# Patient Record
Sex: MALE | Race: WHITE | NOT HISPANIC OR LATINO | URBAN - METROPOLITAN AREA
[De-identification: names, ages, dates, MRNs, and addresses within clinical notes are randomized per-mention and may not be internally consistent; named-entity substitution may affect disease eponyms.]

---

## 2022-07-14 ENCOUNTER — LAB OUTSIDE AN ENCOUNTER (OUTPATIENT)
Dept: URBAN - METROPOLITAN AREA CLINIC 63 | Facility: CLINIC | Age: 22
End: 2022-07-14

## 2022-07-14 ENCOUNTER — OFFICE VISIT (OUTPATIENT)
Dept: URBAN - METROPOLITAN AREA CLINIC 63 | Facility: CLINIC | Age: 22
End: 2022-07-14
Payer: COMMERCIAL

## 2022-07-14 ENCOUNTER — WEB ENCOUNTER (OUTPATIENT)
Dept: URBAN - METROPOLITAN AREA CLINIC 63 | Facility: CLINIC | Age: 22
End: 2022-07-14

## 2022-07-14 VITALS
DIASTOLIC BLOOD PRESSURE: 90 MMHG | BODY MASS INDEX: 23.51 KG/M2 | OXYGEN SATURATION: 96 % | TEMPERATURE: 98 F | WEIGHT: 164.2 LBS | HEART RATE: 116 BPM | SYSTOLIC BLOOD PRESSURE: 142 MMHG | HEIGHT: 70 IN

## 2022-07-14 DIAGNOSIS — R10.84 GENERALIZED ABDOMINAL PAIN: ICD-10-CM

## 2022-07-14 DIAGNOSIS — R19.4 CHANGE IN BOWEL HABIT: ICD-10-CM

## 2022-07-14 PROCEDURE — 99203 OFFICE O/P NEW LOW 30 MIN: CPT | Performed by: INTERNAL MEDICINE

## 2022-07-14 NOTE — HPI-TODAY'S VISIT:
Manish is a 22 y/o male that presents today as a new patient. Past medical history significant for anxiety and depression. Denies any prior GI history or surgical history. He presents today for further evaluation of his GI symptoms. He complains of generalized abdominal discomfort and bloating. He will sometimes have several bowel movements in a day. However, he denies frequent diarrhea or loose stools. He will have an occasional loose or soft stool. Denies blood in the stool, melena, hematochezia. Denies frequent heartbur or dysphagia.

## 2022-07-20 ENCOUNTER — TELEPHONE ENCOUNTER (OUTPATIENT)
Dept: URBAN - METROPOLITAN AREA CLINIC 63 | Facility: CLINIC | Age: 22
End: 2022-07-20

## 2022-08-22 ENCOUNTER — OFFICE VISIT (OUTPATIENT)
Dept: URBAN - METROPOLITAN AREA SURGERY CENTER 4 | Facility: SURGERY CENTER | Age: 22
End: 2022-08-22
Payer: COMMERCIAL

## 2022-08-22 ENCOUNTER — CLAIMS CREATED FROM THE CLAIM WINDOW (OUTPATIENT)
Dept: URBAN - METROPOLITAN AREA CLINIC 4 | Facility: CLINIC | Age: 22
End: 2022-08-22
Payer: COMMERCIAL

## 2022-08-22 DIAGNOSIS — K31.89 OTHER DISEASES OF STOMACH AND DUODENUM: ICD-10-CM

## 2022-08-22 DIAGNOSIS — R14.0 ABDOMINAL BLOATING: ICD-10-CM

## 2022-08-22 DIAGNOSIS — K63.89 OTHER SPECIFIED DISEASES OF INTESTINE: ICD-10-CM

## 2022-08-22 DIAGNOSIS — K21.9 GASTRO-ESOPHAGEAL REFLUX DISEASE WITHOUT ESOPHAGITIS: ICD-10-CM

## 2022-08-22 DIAGNOSIS — R10.84 GENERALIZED ABDOMINAL PAIN: ICD-10-CM

## 2022-08-22 DIAGNOSIS — K64.0 GRADE I HEMORRHOIDS: ICD-10-CM

## 2022-08-22 DIAGNOSIS — K22.89 OTHER SPECIFIED DISEASE OF ESOPHAGUS: ICD-10-CM

## 2022-08-22 DIAGNOSIS — K29.70 GASTRITIS, UNSPECIFIED, WITHOUT BLEEDING: ICD-10-CM

## 2022-08-22 PROCEDURE — 43239 EGD BIOPSY SINGLE/MULTIPLE: CPT | Performed by: INTERNAL MEDICINE

## 2022-08-22 PROCEDURE — 45380 COLONOSCOPY AND BIOPSY: CPT | Performed by: INTERNAL MEDICINE

## 2022-08-22 PROCEDURE — 88313 SPECIAL STAINS GROUP 2: CPT | Performed by: PATHOLOGY

## 2022-08-22 PROCEDURE — 88312 SPECIAL STAINS GROUP 1: CPT | Performed by: PATHOLOGY

## 2022-08-22 PROCEDURE — 88342 IMHCHEM/IMCYTCHM 1ST ANTB: CPT | Performed by: PATHOLOGY

## 2022-08-22 PROCEDURE — 88305 TISSUE EXAM BY PATHOLOGIST: CPT | Performed by: PATHOLOGY

## 2022-08-22 PROCEDURE — G8907 PT DOC NO EVENTS ON DISCHARG: HCPCS | Performed by: INTERNAL MEDICINE

## 2022-08-22 PROCEDURE — G8918 PT W/O PREOP ORDER IV AB PRO: HCPCS | Performed by: INTERNAL MEDICINE

## 2022-08-23 PROBLEM — 196731005 GASTRODUODENITIS: Status: ACTIVE | Noted: 2022-08-23

## 2022-09-06 ENCOUNTER — OFFICE VISIT (OUTPATIENT)
Dept: URBAN - METROPOLITAN AREA CLINIC 63 | Facility: CLINIC | Age: 22
End: 2022-09-06

## 2022-09-06 NOTE — HPI-TODAY'S VISIT:
21-year-old male presented to the office in July for evaluation of generalized abdominal discomfort, bloating, and frequent bowel movements.  He reported having several bowel movements in a day but denied any diarrhea or loose stools.  He denied any blood in the stool.  Labs were ordered including CBC, CMP, CRP, lipase, celiac panel but have not been completed.  CT abdomen/pelvis was ordered but was not completed. He follows up after EGD and colonoscopy which were done on August 22, 2022.  His EGD demonstrated an irregular Z-line at the GE junction.  Mild gastritis was found in the gastric body and antrum.  The duodenum appeared normal.  Colonoscopy was normal to the terminal ileum with the exception of small internal hemorrhoids.  Repeat colonoscopy was recommended at age 43.

## 2022-09-19 ENCOUNTER — OFFICE VISIT (OUTPATIENT)
Dept: URBAN - METROPOLITAN AREA CLINIC 63 | Facility: CLINIC | Age: 22
End: 2022-09-19

## 2022-09-23 ENCOUNTER — DASHBOARD ENCOUNTERS (OUTPATIENT)
Age: 22
End: 2022-09-23

## 2022-09-26 ENCOUNTER — OFFICE VISIT (OUTPATIENT)
Dept: URBAN - METROPOLITAN AREA CLINIC 63 | Facility: CLINIC | Age: 22
End: 2022-09-26

## 2022-09-26 NOTE — HPI-TODAY'S VISIT:
21-year-old male presented to the office in July for evaluation of generalized abdominal discomfort, bloating, and frequent bowel movements.  He reported having several bowel movements in a day but denied any diarrhea or loose stools.  He denied any blood in the stool.  Labs were ordered including CBC, CMP, CRP, lipase, celiac panel but have not been completed.  CT abdomen/pelvis was ordered but was not completed. He follows up after EGD and colonoscopy which were done on August 22, 2022.  His EGD demonstrated an irregular Z-line at the GE junction. Biopsy of the GE junction showed reflux-type changes. No intestinal metaplasia. Mild gastritis was found in the gastric body and antrum. Gastric biopsy showed no significant abnormality.  The duodenum appeared normal with unremarkable biopsy.   Colonoscopy was normal to the terminal ileum with the exception of small internal hemorrhoids.  Repeat colonoscopy was recommended at age 43.

## 2022-10-11 ENCOUNTER — OFFICE VISIT (OUTPATIENT)
Dept: URBAN - METROPOLITAN AREA CLINIC 63 | Facility: CLINIC | Age: 22
End: 2022-10-11